# Patient Record
(demographics unavailable — no encounter records)

---

## 2024-12-16 NOTE — PLAN
[FreeTextEntry1] : 68 yo pt presents for annual exam.  Plan: - Pap smear today - Mammo UTD - DEXA UTD - Colon UTD  RTO in two years.

## 2024-12-16 NOTE — END OF VISIT
[FreeTextEntry3] : I, Jarred Hollingsworth, acted as a scribe on behalf of Dr. Gwendolyn Terrazas M.D., on 12/16/2024.           All medical entries made by the scribe were at my, Dr. Gwendolyn Terrazas M.D., direction and personally dictated by me on 12/16/2024. I have reviewed the chart and agree that the record accurately reflects my personal performance of the history, physical exam, assessment and plan. I have also personally directed, reviewed, and agreed with the chart.

## 2024-12-16 NOTE — HISTORY OF PRESENT ILLNESS
[FreeTextEntry1] : 68 yo pt presents for annual exam.   OBHx:  x2 (,) GYNHx: h/o prophylactic BSO Shx: she has grand daughter 20 months (daughter's child) and 2 grandsons 7 and 11 YO (from her son) Pt followed by neurologist, had two episodes in which she passed out- possible seizures [Mammogramdate] : 6/24 [PapSmeardate] : 11/28/2022 [TextBox_37] : DEXA is followed by endocrinologist. [ColonoscopyDate] : 2020

## 2025-01-12 NOTE — PHYSICAL EXAM
[de-identified] : R hand: +thumb CMC swelling +thumb CMC tenderness Decreased thumb ROM +Basal grind test

## 2025-01-12 NOTE — HISTORY OF PRESENT ILLNESS
[de-identified] : R  thumb CMC OA She had injection in May2024 with relief untl recently  She now has pain and swelling   [FreeTextEntry1] : hands

## 2025-01-12 NOTE — ASSESSMENT
[FreeTextEntry1] : CMC arthritis is a progressive problem that once occurs will be a chronic issue that will likely continue until surgical treatment is necessary. Treatment options for arthritis include OTC NSAIDS, prescription NSAIDS, ice, bracing, OT, cortisone injection, and surgery. Surgical options include arthroplasty and fusion of the arthritic joint. I recommend taking Mobic 15mg daily as an anti-inflammatory. However, it should not be prescribed due to the patient's medical condition of GI issues. The patient understands.  R thumb dorsal CMC small joint injection was performed to treat pain and inflammation under aseptic conditions with betadine and alcohol Anesthesia: ethyl chloride sprayed topically Celestone 6mg/1cc: An injection of Celestone 1cc Lidocaine: An injection of Lidocaine 1% 1cc Marcaine: An injection of Marcaine 0.5% 1cc 25G needle     The risks, benefits, and alternatives to cortisone injection were explained in full to the patient. Risks outlined include but are not limited to infection, sepsis, bleeding, scarring, skin discoloration, temporary increase in pain, syncopal episode, failure to resolve symptoms, allergic reaction, symptom recurrence, and elevation of blood sugar in diabetics. Patient understood the risks. All questions were answered. After discussion of options, patient verbally consented to an injection. Sterile prep was done of the injection site. Patient tolerated the procedure well. Advised to ice the injection site this evening.

## 2025-01-12 NOTE — REASON FOR VISIT
[FreeTextEntry2] :  01/10/2025:  69-year-old female here today for: R thumb pain. She states a week ago, the thumb started to throb.

## 2025-01-12 NOTE — HISTORY OF PRESENT ILLNESS
[de-identified] : R  thumb CMC OA She had injection in May2024 with relief untl recently  She now has pain and swelling   [FreeTextEntry1] : hands

## 2025-01-12 NOTE — PHYSICAL EXAM
[de-identified] : R hand: +thumb CMC swelling +thumb CMC tenderness Decreased thumb ROM +Basal grind test